# Patient Record
Sex: MALE | Race: WHITE | NOT HISPANIC OR LATINO | URBAN - METROPOLITAN AREA
[De-identification: names, ages, dates, MRNs, and addresses within clinical notes are randomized per-mention and may not be internally consistent; named-entity substitution may affect disease eponyms.]

---

## 2024-12-24 ENCOUNTER — OFFICE VISIT (OUTPATIENT)
Dept: URGENT CARE | Age: 24
End: 2024-12-24
Payer: COMMERCIAL

## 2024-12-24 VITALS
RESPIRATION RATE: 20 BRPM | HEART RATE: 94 BPM | TEMPERATURE: 98.3 F | HEIGHT: 75 IN | WEIGHT: 205 LBS | OXYGEN SATURATION: 99 % | SYSTOLIC BLOOD PRESSURE: 147 MMHG | BODY MASS INDEX: 25.49 KG/M2 | DIASTOLIC BLOOD PRESSURE: 85 MMHG

## 2024-12-24 DIAGNOSIS — L02.01 ABSCESS OF FACE: Primary | ICD-10-CM

## 2024-12-24 RX ORDER — SULFAMETHOXAZOLE AND TRIMETHOPRIM 800; 160 MG/1; MG/1
1 TABLET ORAL 2 TIMES DAILY
Qty: 14 TABLET | Refills: 0 | Status: SHIPPED | OUTPATIENT
Start: 2024-12-24

## 2024-12-24 RX ORDER — MUPIROCIN 20 MG/G
OINTMENT TOPICAL
Qty: 22 G | Refills: 0 | Status: SHIPPED | OUTPATIENT
Start: 2024-12-24 | End: 2024-12-31

## 2024-12-24 ASSESSMENT — ENCOUNTER SYMPTOMS
CONSTITUTIONAL NEGATIVE: 1
ROS SKIN COMMENTS: LESION

## 2024-12-24 ASSESSMENT — PAIN SCALES - GENERAL: PAINLEVEL_OUTOF10: 4

## 2024-12-24 NOTE — PATIENT INSTRUCTIONS
Take medications as prescribed. Take with food and probiotic     Apply warm compresses 2-3 times daily     Follow up with PCP in 3-5 days for recheck     Watch for signs of worsening infection such as fever, pain, increased redness, warmth or red streaks - if these occur please go to ER

## 2024-12-24 NOTE — PROGRESS NOTES
"Subjective   Patient ID: Varghese Crane is a 24 y.o. male. They present today with a chief complaint of Facial Swelling (Couple of days, off/on, painful bump on left side of upper lip. ).    History of Present Illness  24-year-old male presents to clinic today with complaints of painful bump to the left upper lip.  Patient states that this is been there for the last couple days.  He states that the left side of his face has become slightly more swollen around the area.  He denies any fevers.  Nuys bodyaches or chills.  Patient states he has had a history of these in the past but never on his face.  He states he has had them on the side of his body and even the buttock area.  He states that he is normally placed on an antibiotic and it clears up.  Patient states he has been getting these flares over the last year.  He denies any change in body care products, detergents, clothing etc.          Past Medical History  Allergies as of 12/24/2024    (No Known Allergies)       (Not in a hospital admission)       No past medical history on file.    No past surgical history on file.         Review of Systems  Review of Systems   Constitutional: Negative.    Skin:         Lesion                                    Objective    Vitals:    12/24/24 0823   BP: 147/85   BP Location: Left arm   Patient Position: Sitting   BP Cuff Size: Adult   Pulse: 94   Resp: 20   Temp: 36.8 °C (98.3 °F)   TempSrc: Oral   SpO2: 99%   Weight: 93 kg (205 lb)   Height: 1.905 m (6' 3\")     No LMP for male patient.    Physical Exam  Constitutional:       Appearance: Normal appearance.   Skin:     Comments: Erythematous, indurated lesion to left upper lip, no significant streaking or spreading erythema   Neurological:      Mental Status: He is alert.         Procedures    Point of Care Test & Imaging Results from this visit  No results found for this visit on 12/24/24.   No results found.    Diagnostic study results (if any) were reviewed by Monroe" LEANNA Rhoades.    Assessment/Plan   Allergies, medications, history, and pertinent labs/EKGs/Imaging reviewed by Monroe Rhoades PA-C.     Medical Decision Making  Patient pleasant cooperative on examination.    There is a noted lesion to the left upper lip.  It is concerning for infection/abscess.    On examination it was indurated, no fluctuance.  Due to that presentation along with it being on the face I discussed the pros and cons about possible I&D.  I am unsure if we would be able to drain any fluid due to the presentation of the lesion.  I also discussed the possibility of scarring on the face.  We held off at this time.    We proceeded with mupirocin ointment along with Bactrim.  Advised him to continue with warm compress and monitor for worsening or persistent signs.    Orders and Diagnoses  There are no diagnoses linked to this encounter.    Medical Admin Record      Patient disposition: Home    Electronically signed by Monroe Rhoades PA-C  8:30 AM

## 2025-07-14 ENCOUNTER — HOSPITAL ENCOUNTER (EMERGENCY)
Facility: HOSPITAL | Age: 25
Discharge: HOME | End: 2025-07-14
Attending: STUDENT IN AN ORGANIZED HEALTH CARE EDUCATION/TRAINING PROGRAM
Payer: COMMERCIAL

## 2025-07-14 ENCOUNTER — OFFICE VISIT (OUTPATIENT)
Dept: URGENT CARE | Age: 25
End: 2025-07-14
Payer: COMMERCIAL

## 2025-07-14 VITALS
HEIGHT: 75 IN | DIASTOLIC BLOOD PRESSURE: 105 MMHG | OXYGEN SATURATION: 99 % | HEART RATE: 98 BPM | RESPIRATION RATE: 20 BRPM | SYSTOLIC BLOOD PRESSURE: 144 MMHG | BODY MASS INDEX: 27.35 KG/M2 | TEMPERATURE: 98.7 F | WEIGHT: 220 LBS

## 2025-07-14 VITALS
OXYGEN SATURATION: 100 % | WEIGHT: 220 LBS | HEIGHT: 75 IN | RESPIRATION RATE: 20 BRPM | BODY MASS INDEX: 27.35 KG/M2 | HEART RATE: 92 BPM | TEMPERATURE: 97 F | SYSTOLIC BLOOD PRESSURE: 134 MMHG | DIASTOLIC BLOOD PRESSURE: 87 MMHG

## 2025-07-14 DIAGNOSIS — S30.1XXA CONTUSION OF ABDOMINAL WALL, INITIAL ENCOUNTER: Primary | ICD-10-CM

## 2025-07-14 DIAGNOSIS — S30.811A ABRASION OF ABDOMINAL WALL, INITIAL ENCOUNTER: Primary | ICD-10-CM
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
## 2025-07-14 DIAGNOSIS — R10.9 ACUTE ABDOMINAL PAIN: ICD-10-CM

## 2025-07-14 PROCEDURE — 99282 EMERGENCY DEPT VISIT SF MDM: CPT | Performed by: STUDENT IN AN ORGANIZED HEALTH CARE EDUCATION/TRAINING PROGRAM

## 2025-07-14 PROCEDURE — 99283 EMERGENCY DEPT VISIT LOW MDM: CPT

## 2025-07-14 RX ORDER — IBUPROFEN 600 MG/1
600 TABLET, FILM COATED ORAL EVERY 6 HOURS PRN
Qty: 30 TABLET | Refills: 0 | Status: SHIPPED | OUTPATIENT
Start: 2025-07-14 | End: 2025-08-13

## 2025-07-14 RX ORDER — ACETAMINOPHEN 500 MG
1000 TABLET ORAL EVERY 6 HOURS PRN
Qty: 30 TABLET | Refills: 0 | Status: SHIPPED | OUTPATIENT
Start: 2025-07-14 | End: 2025-08-13

## 2025-07-14 ASSESSMENT — PAIN SCALES - GENERAL: PAINLEVEL_OUTOF10: 0 - NO PAIN

## 2025-07-14 ASSESSMENT — PAIN - FUNCTIONAL ASSESSMENT: PAIN_FUNCTIONAL_ASSESSMENT: 0-10
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
## 2025-07-14 NOTE — DISCHARGE INSTRUCTIONS
Thank you for allowing myself and the team to take care of you during your emergency situation and addressing your health concerns.    You were evaluated for abdominal pain.    Your likely condition/diagnosis: Superficial blunt force abdominal wall trauma    During your visit in the emergency department you were evaluated for your presenting symptoms.  Based on the extensive workup you received, all efforts were made to identify the source of your symptoms and identify any life-threatening conditions.  These life-threatening conditions that were attempted to be identified and medically managed/treated include but not limited to bleeding/non-bleeding ulcers/wounds of your stomach or intestines, hole in your stomach or intestines, infection/inflammation of your liver, infection/inflammation of your gallbladder and associated biliary (bile) system, appendicitis (infection/inflammation of your appendix), infection/inflammation of your pancreas, bowel/intestine obstruction, small or large intestine inflammation/infection, infection/abscess inside your abdomen, hematoma/bleeding (hemorrhage) inside your abdomen, pregnancy or ectopic pregnancy, ovarian torsion, infection/inflammation of your uterus/fallopian tubes.  Additionally, you may be experiencing symptoms that are non-life-threatening but are uncomfortable and disruptive to your everyday life.  All efforts were made to manage your symptoms while in the emergency department along with appropriate at home therapy for symptomatic management during your recovery. Please be aware that not all of the conditions explained/discussed in these discharge instructions are applicable to your condition but encompass many of the conditions that were evaluated for during your ED encounter.      During your evaluation and assessment, all measures were taken to evaluate you and address your health concerns to identify dangerous and life threatening health conditions. It is not  possible to identify all health conditions or pathologies and eliminate any chance of unfavorable outcomes while in the Emergency Department. My team encourages you to be vigilant with your health and follow-up with the appropriate providers outlined in your discharge paperwork. Please return to the Emergency Department if you feel that your health has not improved or experiencing worsening symptoms.    Special instructions please take the meds as prescribed.  Please follow-up with your primary care physician to further manage symptoms and address your concerns.  If you do not have a primary care physician a referral was placed to establish care and further manage your health.    Incidental findings:  None